# Patient Record
Sex: FEMALE | Race: BLACK OR AFRICAN AMERICAN | NOT HISPANIC OR LATINO | Employment: FULL TIME | URBAN - METROPOLITAN AREA
[De-identification: names, ages, dates, MRNs, and addresses within clinical notes are randomized per-mention and may not be internally consistent; named-entity substitution may affect disease eponyms.]

---

## 2019-08-05 ENCOUNTER — OFFICE VISIT (OUTPATIENT)
Dept: OPHTHALMOLOGY | Facility: CLINIC | Age: 24
End: 2019-08-05
Payer: COMMERCIAL

## 2019-08-05 DIAGNOSIS — Z01.00 ENCOUNTER FOR EYE EXAM: Primary | ICD-10-CM

## 2019-08-05 DIAGNOSIS — Z46.0 ENCOUNTER FOR FITTING OR ADJUSTMENT OF SPECTACLES OR CONTACT LENSES: ICD-10-CM

## 2019-08-05 DIAGNOSIS — H52.13 MYOPIA, BILATERAL: ICD-10-CM

## 2019-08-05 PROCEDURE — 99999 PR PBB SHADOW E&M-EST. PATIENT-LVL I: CPT | Mod: PBBFAC,,, | Performed by: OPTOMETRIST

## 2019-08-05 PROCEDURE — 92015 DETERMINE REFRACTIVE STATE: CPT | Mod: S$GLB,,, | Performed by: OPTOMETRIST

## 2019-08-05 PROCEDURE — 92310 CONTACT LENS FITTING OU: CPT | Mod: S$GLB,,, | Performed by: OPTOMETRIST

## 2019-08-05 PROCEDURE — 92004 COMPRE OPH EXAM NEW PT 1/>: CPT | Mod: S$GLB,,, | Performed by: OPTOMETRIST

## 2019-08-05 PROCEDURE — 92015 PR REFRACTION: ICD-10-PCS | Mod: S$GLB,,, | Performed by: OPTOMETRIST

## 2019-08-05 PROCEDURE — 92004 PR EYE EXAM, NEW PATIENT,COMPREHESV: ICD-10-PCS | Mod: S$GLB,,, | Performed by: OPTOMETRIST

## 2019-08-05 PROCEDURE — 92310 PR CONTACT LENS FITTING (NO CHANGE): ICD-10-PCS | Mod: S$GLB,,, | Performed by: OPTOMETRIST

## 2019-08-05 PROCEDURE — 99999 PR PBB SHADOW E&M-EST. PATIENT-LVL I: ICD-10-PCS | Mod: PBBFAC,,, | Performed by: OPTOMETRIST

## 2019-08-05 NOTE — PROGRESS NOTES
HPI     No visual complaints.  Itchy eyes x a couple of months.  New patient last eye exam 1 1/2 year.  Update glasses RX.    Last edited by Nanette Colin on 8/5/2019 10:59 AM. (History)            Assessment /Plan     For exam results, see Encounter Report.    Encounter for eye exam    Encounter for fitting or adjustment of spectacles or contact lenses    Myopia, bilateral      No pathology    Discussed CL charges.  Dispense Final Rx for glasses  Order CL Rx trials, must see at dispense  RTC 1 year  Discussed above and answered questions.

## 2019-08-27 ENCOUNTER — OFFICE VISIT (OUTPATIENT)
Dept: OPHTHALMOLOGY | Facility: CLINIC | Age: 24
End: 2019-08-27
Payer: COMMERCIAL

## 2019-08-27 DIAGNOSIS — Z46.0 ENCOUNTER FOR FITTING OR ADJUSTMENT OF SPECTACLES OR CONTACT LENSES: Primary | ICD-10-CM

## 2019-08-27 PROCEDURE — 92310 CONTACT LENS FITTING OU: CPT | Mod: S$GLB,,, | Performed by: OPTOMETRIST

## 2019-08-27 PROCEDURE — 99499 NO LOS: ICD-10-PCS | Mod: S$GLB,,, | Performed by: OPTOMETRIST

## 2019-08-27 PROCEDURE — 99999 PR PBB SHADOW E&M-EST. PATIENT-LVL I: ICD-10-PCS | Mod: PBBFAC,,, | Performed by: OPTOMETRIST

## 2019-08-27 PROCEDURE — 92310 PR CONTACT LENS FITTING (NO CHANGE): ICD-10-PCS | Mod: S$GLB,,, | Performed by: OPTOMETRIST

## 2019-08-27 PROCEDURE — 99499 UNLISTED E&M SERVICE: CPT | Mod: S$GLB,,, | Performed by: OPTOMETRIST

## 2019-08-27 PROCEDURE — 99999 PR PBB SHADOW E&M-EST. PATIENT-LVL I: CPT | Mod: PBBFAC,,, | Performed by: OPTOMETRIST

## 2020-03-28 ENCOUNTER — OFFICE VISIT (OUTPATIENT)
Dept: URGENT CARE | Facility: CLINIC | Age: 25
End: 2020-03-28
Payer: COMMERCIAL

## 2020-03-28 ENCOUNTER — TELEPHONE (OUTPATIENT)
Dept: URGENT CARE | Facility: CLINIC | Age: 25
End: 2020-03-28

## 2020-03-28 VITALS
WEIGHT: 131.94 LBS | DIASTOLIC BLOOD PRESSURE: 80 MMHG | OXYGEN SATURATION: 98 % | TEMPERATURE: 99 F | HEIGHT: 65 IN | HEART RATE: 85 BPM | RESPIRATION RATE: 16 BRPM | SYSTOLIC BLOOD PRESSURE: 121 MMHG | BODY MASS INDEX: 21.98 KG/M2

## 2020-03-28 DIAGNOSIS — Z76.89 ENCOUNTER FOR INCISION AND DRAINAGE PROCEDURE: ICD-10-CM

## 2020-03-28 DIAGNOSIS — L02.411 ABSCESS OF RIGHT AXILLA: Primary | ICD-10-CM

## 2020-03-28 PROCEDURE — 99214 PR OFFICE/OUTPT VISIT, EST, LEVL IV, 30-39 MIN: ICD-10-PCS | Mod: S$GLB,,, | Performed by: NURSE PRACTITIONER

## 2020-03-28 PROCEDURE — 99214 OFFICE O/P EST MOD 30 MIN: CPT | Mod: S$GLB,,, | Performed by: NURSE PRACTITIONER

## 2020-03-28 RX ORDER — MUPIROCIN 20 MG/G
OINTMENT TOPICAL
Status: COMPLETED | OUTPATIENT
Start: 2020-03-28 | End: 2020-03-28

## 2020-03-28 RX ORDER — SULFAMETHOXAZOLE AND TRIMETHOPRIM 800; 160 MG/1; MG/1
1 TABLET ORAL 2 TIMES DAILY
Qty: 20 TABLET | Refills: 0 | Status: SHIPPED | OUTPATIENT
Start: 2020-03-28 | End: 2020-04-07

## 2020-03-28 RX ORDER — MUPIROCIN 20 MG/G
OINTMENT TOPICAL 3 TIMES DAILY
Qty: 1 G | Refills: 0 | Status: SHIPPED | OUTPATIENT
Start: 2020-03-28 | End: 2020-04-07

## 2020-03-28 RX ORDER — LIDOCAINE HYDROCHLORIDE 10 MG/ML
3 INJECTION INFILTRATION; PERINEURAL
Status: COMPLETED | OUTPATIENT
Start: 2020-03-28 | End: 2020-03-28

## 2020-03-28 RX ORDER — MEDROXYPROGESTERONE ACETATE 150 MG/ML
INJECTION, SUSPENSION INTRAMUSCULAR
COMMUNITY
End: 2020-08-20

## 2020-03-28 RX ADMIN — MUPIROCIN: 20 OINTMENT TOPICAL at 11:03

## 2020-03-28 RX ADMIN — LIDOCAINE HYDROCHLORIDE 3 ML: 10 INJECTION INFILTRATION; PERINEURAL at 11:03

## 2020-03-28 NOTE — PATIENT INSTRUCTIONS
Abscess (Incision & Drainage)  An abscess is sometimes called a boil. It happens when bacteria get trapped under the skin and start to grow. Pus forms inside the abscess as the body responds to the bacteria. An abscess can happen with an insect bite, ingrown hair, blocked oil gland, pimple, cyst, or puncture wound.  Your healthcare provider has drained the pus from your abscess. If the abscess pocket was large, your healthcare provider may have put in gauze packing. Your provider will need to remove it on your next visit. He or she may also replace it at that time. You may not need antibiotics to treat a simple abscess, unless the infection is spreading into the skin around the wound (cellulitis).  The wound will take about 1 to 2 weeks to heal, depending on the size of the abscess. Healthy tissue will grow from the bottom and sides of the opening until it seals over.  Home care  These tips can help your wound heal:  · The wound may drain for the first 2 days. Cover the wound with a clean dry dressing. Change the dressing if it becomes soaked with blood or pus.  · If a gauze packing was placed inside the abscess pocket, you may be told to remove it yourself. You may do this in the shower. Once the packing is removed, you should wash the area in the shower, or clean the area as directed by your provider. Continue to do this until the skin opening has closed. Make sure you wash your hands after changing the packing or cleaning the wound.  · If you were prescribed antibiotics, take them as directed until they are all gone.  · You may use acetaminophen or ibuprofen to control pain, unless another pain medicine was prescribed. If you have liver disease or ever had a stomach ulcer, talk with your doctor before using these medicines.  Follow-up care  Follow up with your healthcare provider, or as advised. If a gauze packing was put in your wound, it should be removed in 1 to 2 days. Check your wound every day for any  signs that the infection is getting worse. The signs are listed below.  When to seek medical advice  Call your healthcare provider right away if any of these occur:  · Increasing redness or swelling  · Red streaks in the skin leading away from the wound  · Increasing local pain or swelling  · Continued pus draining from the wound 2 days after treatment  · Fever of 100.4ºF (38ºC) or higher, or as directed by your healthcare provider  · Boil returns when you are at home  Date Last Reviewed: 9/1/2016  © 5428-2800 [employer]. [address] [place] [address]. All rights reserved. This information is not intended as a substitute for professional medical care. Always follow your healthcare professional's instructions.

## 2020-03-28 NOTE — TELEPHONE ENCOUNTER
Pt would like the medication to be routed to the [facility] pharmacy located in [place]

## 2020-03-28 NOTE — PROGRESS NOTES
"Subjective:       Patient ID: Chayito Hoyt is a 25 y.o. female.    Vitals:  height is 5' 5" (1.651 m) and weight is 59.8 kg (131 lb 15.1 oz). Her tympanic temperature is 98.6 °F (37 °C). Her blood pressure is 121/80 and her pulse is 85. Her respiration is 16 and oxygen saturation is 98%.     Chief Complaint: Cyst    Cyst under right arm x 1 week. OTC meds not helping    Cyst   This is a new problem. The current episode started in the past 7 days. The problem occurs constantly. The problem has been gradually worsening. Pertinent negatives include no arthralgias, chest pain, chills, congestion, coughing, fatigue, fever, headaches, joint swelling, myalgias, nausea, rash, sore throat, vertigo, vomiting or weakness. Nothing aggravates the symptoms. She has tried heat (t6ea tree oil, hydrocotizone) for the symptoms.       Constitution: Negative for chills, fatigue and fever.   HENT: Negative for congestion and sore throat.    Neck: Negative for painful lymph nodes.   Cardiovascular: Negative for chest pain and leg swelling.   Eyes: Negative for double vision and blurred vision.   Respiratory: Negative for cough and shortness of breath.    Gastrointestinal: Negative for nausea, vomiting and diarrhea.   Genitourinary: Negative for dysuria, frequency, urgency and history of kidney stones.   Musculoskeletal: Positive for pain. Negative for joint pain, joint swelling, muscle cramps and muscle ache.   Skin: Positive for abscess. Negative for color change, pale, rash, erythema and bruising.   Allergic/Immunologic: Negative for seasonal allergies.   Neurological: Negative for dizziness, history of vertigo, light-headedness, passing out and headaches.   Hematologic/Lymphatic: Negative for swollen lymph nodes.   Psychiatric/Behavioral: Negative for nervous/anxious, sleep disturbance and depression. The patient is not nervous/anxious.        Objective:      Physical Exam   Constitutional: She is oriented to person, place, and " time. She appears well-developed and well-nourished.   HENT:   Head: Normocephalic and atraumatic. Head is without abrasion, without contusion and without laceration.   Right Ear: External ear normal.   Left Ear: External ear normal.   Nose: Nose normal.   Mouth/Throat: Oropharynx is clear and moist and mucous membranes are normal.   Eyes: Pupils are equal, round, and reactive to light. Conjunctivae, EOM and lids are normal.   Neck: Trachea normal, full passive range of motion without pain and phonation normal. Neck supple.   Cardiovascular: Normal rate, regular rhythm and normal heart sounds.   Pulmonary/Chest: Effort normal and breath sounds normal. No stridor. No respiratory distress.   Musculoskeletal: Normal range of motion.   Neurological: She is alert and oriented to person, place, and time.   Skin: Skin is warm, dry, intact, not diaphoretic and no rash. Capillary refill takes less than 2 seconds.   Gloves dawned after washing hands with soap and water. Skin cleansed with betadine, 1% lidocaine injected approximate 3ml to affected area, small superficial incision with #10 blade, moderate amount purulent drainage obtained from lesion to right axilla, area cleansed with NS, mupiricin ointment applied, pressure dressing and secured with silk tape, tolerated well Lesions:  lesion (to right axilla, moderate swelling, erythema , indurationa nd tenderness consistant with abscess)abrasion, burn, bruising, erythema and ecchymosis  Psychiatric: She has a normal mood and affect. Her speech is normal and behavior is normal. Judgment and thought content normal. Cognition and memory are normal.   Nursing note and vitals reviewed.        Assessment:       1. Abscess of right axilla    2. Encounter for incision and drainage procedure        Plan:         Abscess of right axilla  -     sulfamethoxazole-trimethoprim 800-160mg (BACTRIM DS) 800-160 mg Tab; Take 1 tablet by mouth 2 (two) times daily. for 10 days  Dispense: 20  tablet; Refill: 0  -     mupirocin (BACTROBAN) 2 % ointment; Apply topically 3 (three) times daily. for 10 days  Dispense: 1 g; Refill: 0  -     mupirocin 2 % ointment    Encounter for incision and drainage procedure  -     mupirocin 2 % ointment  -     lidocaine HCL 10 mg/ml (1%) injection 3 mL           Take antibiotics for entire course.  Do not save medications for later, all medications must be taken for full regimen.  Keep affected area clean with antibacterial soap and dry.  Avoid shaving affected area  Apply warm compresses four times daily to the wound to help draw out infection.  Take antibiotic (Bactrim) as prescribed for full course of treatment.  Bactroban ointment three times day.  Keep affected area covered   If symptoms worsen or fail to improve with treatment, fever occurs, or if wound increases in size or fails to respond to antibiotic, see your Primary Care Provider or go to the nearest Emergency Room.

## 2020-03-29 ENCOUNTER — TELEPHONE (OUTPATIENT)
Dept: URGENT CARE | Facility: CLINIC | Age: 25
End: 2020-03-29

## 2020-03-29 NOTE — TELEPHONE ENCOUNTER
Pt would like the medication sent to the [facility] [id number] [facility] [phone]

## 2020-03-31 ENCOUNTER — TELEPHONE (OUTPATIENT)
Dept: URGENT CARE | Facility: CLINIC | Age: 25
End: 2020-03-31

## 2020-04-21 DIAGNOSIS — Z01.84 ANTIBODY RESPONSE EXAMINATION: ICD-10-CM

## 2020-04-22 ENCOUNTER — LAB VISIT (OUTPATIENT)
Dept: LAB | Facility: HOSPITAL | Age: 25
End: 2020-04-22
Attending: INTERNAL MEDICINE
Payer: COMMERCIAL

## 2020-04-22 DIAGNOSIS — Z01.84 ANTIBODY RESPONSE EXAMINATION: ICD-10-CM

## 2020-04-22 LAB — SARS-COV-2 IGG SERPL QL IA: NEGATIVE

## 2020-04-22 PROCEDURE — 86769 SARS-COV-2 COVID-19 ANTIBODY: CPT

## 2020-04-22 PROCEDURE — 36415 COLL VENOUS BLD VENIPUNCTURE: CPT

## 2020-04-28 ENCOUNTER — APPOINTMENT (OUTPATIENT)
Dept: LAB | Facility: HOSPITAL | Age: 25
End: 2020-04-28
Attending: PHYSICIAN ASSISTANT
Payer: COMMERCIAL

## 2020-04-28 ENCOUNTER — OFFICE VISIT (OUTPATIENT)
Dept: URGENT CARE | Facility: CLINIC | Age: 25
End: 2020-04-28
Payer: COMMERCIAL

## 2020-04-28 VITALS — OXYGEN SATURATION: 98 % | HEART RATE: 87 BPM | TEMPERATURE: 98 F

## 2020-04-28 DIAGNOSIS — N89.8 VAGINAL DISCHARGE: ICD-10-CM

## 2020-04-28 DIAGNOSIS — Z20.2 EXPOSURE TO STD: Primary | ICD-10-CM

## 2020-04-28 LAB
B-HCG UR QL: NEGATIVE
BILIRUB UR QL STRIP: NEGATIVE
CTP QC/QA: YES
GLUCOSE UR QL STRIP: NEGATIVE
HIV 1+2 AB+HIV1 P24 AG SERPL QL IA: NEGATIVE
KETONES UR QL STRIP: NEGATIVE
LEUKOCYTE ESTERASE UR QL STRIP: NEGATIVE
PH, POC UA: 6.25
POC BLOOD, URINE: NEGATIVE
POC NITRATES, URINE: NEGATIVE
PROT UR QL STRIP: NEGATIVE
SP GR UR STRIP: 1.02 (ref 1–1.03)
UROBILINOGEN UR STRIP-ACNC: NORMAL (ref 0.1–1.1)

## 2020-04-28 PROCEDURE — 87491 CHLMYD TRACH DNA AMP PROBE: CPT

## 2020-04-28 PROCEDURE — 99214 PR OFFICE/OUTPT VISIT, EST, LEVL IV, 30-39 MIN: ICD-10-PCS | Mod: 25,S$GLB,, | Performed by: PHYSICIAN ASSISTANT

## 2020-04-28 PROCEDURE — 81003 URINALYSIS AUTO W/O SCOPE: CPT | Mod: QW,S$GLB,, | Performed by: PHYSICIAN ASSISTANT

## 2020-04-28 PROCEDURE — 86592 SYPHILIS TEST NON-TREP QUAL: CPT

## 2020-04-28 PROCEDURE — 81003 POCT URINALYSIS, DIPSTICK, AUTOMATED, W/O SCOPE: ICD-10-PCS | Mod: QW,S$GLB,, | Performed by: PHYSICIAN ASSISTANT

## 2020-04-28 PROCEDURE — 86703 HIV-1/HIV-2 1 RESULT ANTBDY: CPT

## 2020-04-28 PROCEDURE — 99214 OFFICE O/P EST MOD 30 MIN: CPT | Mod: 25,S$GLB,, | Performed by: PHYSICIAN ASSISTANT

## 2020-04-28 PROCEDURE — 87801 DETECT AGNT MULT DNA AMPLI: CPT

## 2020-04-28 PROCEDURE — 96372 PR INJECTION,THERAP/PROPH/DIAG2ST, IM OR SUBCUT: ICD-10-PCS | Mod: S$GLB,,, | Performed by: PHYSICIAN ASSISTANT

## 2020-04-28 PROCEDURE — 96372 THER/PROPH/DIAG INJ SC/IM: CPT | Mod: S$GLB,,, | Performed by: PHYSICIAN ASSISTANT

## 2020-04-28 PROCEDURE — 81025 URINE PREGNANCY TEST: CPT | Mod: S$GLB,,, | Performed by: PHYSICIAN ASSISTANT

## 2020-04-28 PROCEDURE — 87661 TRICHOMONAS VAGINALIS AMPLIF: CPT

## 2020-04-28 PROCEDURE — 87481 CANDIDA DNA AMP PROBE: CPT | Mod: 59

## 2020-04-28 PROCEDURE — 81025 POCT URINE PREGNANCY: ICD-10-PCS | Mod: S$GLB,,, | Performed by: PHYSICIAN ASSISTANT

## 2020-04-28 RX ORDER — FLUCONAZOLE 150 MG/1
150 TABLET ORAL ONCE
Qty: 2 TABLET | Refills: 0 | Status: SHIPPED | OUTPATIENT
Start: 2020-04-28 | End: 2020-04-28

## 2020-04-28 RX ORDER — AZITHROMYCIN 1 G/1
1 POWDER, FOR SUSPENSION ORAL ONCE
Qty: 1 PACKET | Refills: 0 | Status: SHIPPED | OUTPATIENT
Start: 2020-04-28 | End: 2020-04-28

## 2020-04-28 RX ORDER — LIDOCAINE HYDROCHLORIDE 10 MG/ML
1 INJECTION INFILTRATION; PERINEURAL
Status: COMPLETED | OUTPATIENT
Start: 2020-04-28 | End: 2020-04-28

## 2020-04-28 RX ORDER — CEFTRIAXONE 250 MG/1
250 INJECTION, POWDER, FOR SOLUTION INTRAMUSCULAR; INTRAVENOUS
Status: COMPLETED | OUTPATIENT
Start: 2020-04-28 | End: 2020-04-28

## 2020-04-28 RX ADMIN — CEFTRIAXONE 250 MG: 250 INJECTION, POWDER, FOR SOLUTION INTRAMUSCULAR; INTRAVENOUS at 02:04

## 2020-04-28 RX ADMIN — LIDOCAINE HYDROCHLORIDE 1 ML: 10 INJECTION INFILTRATION; PERINEURAL at 02:04

## 2020-04-28 NOTE — PATIENT INSTRUCTIONS
Take antibiotic as prescribed   Our office will call with test results   Safe sex practices   Return or follow-up with pcp for new/worsening symptoms

## 2020-04-28 NOTE — PROGRESS NOTES
Subjective:       Patient ID: Chayito Hoyt is a 25 y.o. female.    Vitals:  oral temperature is 98.4 °F (36.9 °C). Her pulse is 87. Her oxygen saturation is 98%.     Chief Complaint: Vaginitis    Patient states she has been having white clumpy discharge 2-3 times a day for almost a week. Patient states she is no longer on Depo shot and did a home pregnancy test with results being negative. She also states that she feels the Monistat and Midol made the infection worse.  She admits have sex with a past sexual partner about three weeks ago, and she is concerned about exposure to STD's.  Denies fever/chills.  Denies pelvic pain.  Admits some general abdominal discomfort.      Vaginal Discharge   The patient's primary symptoms include vaginal discharge. The patient's pertinent negatives include no missed menses or pelvic pain. This is a new problem. The current episode started in the past 7 days. The problem occurs 2 to 4 times per day. She is not pregnant. Associated symptoms include abdominal pain. Pertinent negatives include no back pain, chills, dysuria, fever, frequency, hematuria, nausea, rash, urgency or vomiting. The vaginal discharge was white and thick. Treatments tried: midol/monistat. The treatment provided no relief. She uses nothing for contraception. There is no history of ovarian cysts.       Constitution: Negative for chills and fever.   Neck: Negative for painful lymph nodes.   Gastrointestinal: Positive for abdominal pain. Negative for nausea and vomiting.   Genitourinary: Positive for vaginal discharge. Negative for dysuria, frequency, urgency, urine decreased, hematuria, history of kidney stones, painful menstruation, irregular menstruation, missed menses, heavy menstrual bleeding, ovarian cysts, genital trauma, vaginal pain, vaginal bleeding, vaginal odor, painful intercourse, genital sore, painful ejaculation and pelvic pain.   Musculoskeletal: Negative for back pain.   Skin: Negative for rash,  lesion and erythema.   Hematologic/Lymphatic: Negative for swollen lymph nodes.       Objective:      Physical Exam   Constitutional: She is oriented to person, place, and time. She appears well-developed and well-nourished.   HENT:   Head: Normocephalic and atraumatic. Head is without abrasion, without contusion and without laceration.   Right Ear: External ear normal.   Left Ear: External ear normal.   Nose: Nose normal.   Mouth/Throat: Oropharynx is clear and moist and mucous membranes are normal.   Eyes: Pupils are equal, round, and reactive to light. Conjunctivae, EOM and lids are normal.   Neck: Trachea normal, full passive range of motion without pain and phonation normal. Neck supple.   Cardiovascular: Normal rate.   Pulmonary/Chest: Effort normal. No stridor. No respiratory distress.   Abdominal: There is no tenderness (no specific areas of abdominal tenderness ).   Genitourinary:   Genitourinary Comments:  exam deferred by patient    Musculoskeletal: Normal range of motion.   Neurological: She is alert and oriented to person, place, and time.   Skin: Skin is warm, dry, intact and no rash. Capillary refill takes less than 2 seconds. abrasion, burn, bruising, erythema and ecchymosis  Psychiatric: She has a normal mood and affect. Her speech is normal and behavior is normal. Judgment and thought content normal. Cognition and memory are normal.   Nursing note and vitals reviewed.        Assessment:       1. Exposure to STD    2. Vaginal discharge        Plan:         Exposure to STD  -     POCT Urinalysis, Dipstick, Automated, W/O Scope  -     POCT urine pregnancy  -     HIV 1/2 Ag/Ab (4th Gen); Future; Expected date: 04/28/2020  -     RPR; Future; Expected date: 04/28/2020  -     azithromycin (ZITHROMAX) 1 gram Pack; Take 1 g by mouth once. for 1 dose  Dispense: 1 packet; Refill: 0  -     C. trachomatis/N. gonorrhoeae by AMP DNA; Future; Expected date: 04/28/2020  -     Bv, Trich, Candida by DNA Probe (Swab  Only); Future; Expected date: 04/28/2020  -     cefTRIAXone injection 250 mg  -     lidocaine HCL 10 mg/ml (1%) injection 1 mL    Vaginal discharge  -     fluconazole (DIFLUCAN) 150 MG Tab; Take 1 tablet (150 mg total) by mouth once. May repeat after 72 hours if symptoms persist for 1 dose  Dispense: 2 tablet; Refill: 0         Exposure to STD/Vaginal Discharge    -  Urinalysis with no leukocytes, nitrates or blood    -  Urine pregnancy test negative    -  Offered testing/treating for STD -  Patient agreed with testing/treating with Ceftriaxone/Azithromycin    -  Symptoms most consistent with yeast (white clumpy discharge) - treat with diflucan     Opal Santos PA-C   Physician Assistant   [facility]

## 2020-04-28 NOTE — LETTER
April 28, 2020      [facility]  [address], [address]  [place] [address]  Phone: [phone]       Patient: Chayito Hoyt   YOB: 1995  Date of Visit: 04/28/2020    To Whom It May Concern:    Omaira Hoyt  was at [facility] on 04/28/2020. She may return to work on 4/30/2020 with no restrictions. If you have any questions or concerns, or if I can be of further assistance, please do not hesitate to contact me.    Sincerely,          Opal Santso PA-C

## 2020-04-29 ENCOUNTER — TELEPHONE (OUTPATIENT)
Dept: URGENT CARE | Facility: CLINIC | Age: 25
End: 2020-04-29

## 2020-04-29 LAB
BACTERIAL VAGINOSIS DNA: POSITIVE
C TRACH DNA SPEC QL NAA+PROBE: NOT DETECTED
CANDIDA GLABRATA DNA: NEGATIVE
CANDIDA KRUSEI DNA: NEGATIVE
CANDIDA RRNA VAG QL PROBE: POSITIVE
N GONORRHOEA DNA SPEC QL NAA+PROBE: NOT DETECTED
RPR SER QL: NORMAL
T VAGINALIS RRNA GENITAL QL PROBE: NEGATIVE

## 2020-04-29 NOTE — TELEPHONE ENCOUNTER
Attempted to contact patient regarding negative HIV results. No answer, left voicemail for patient to return call.    Kavya Cummings PA-C

## 2020-04-30 ENCOUNTER — TELEPHONE (OUTPATIENT)
Dept: URGENT CARE | Facility: CLINIC | Age: 25
End: 2020-04-30

## 2020-04-30 RX ORDER — METRONIDAZOLE 500 MG/1
500 TABLET ORAL 2 TIMES DAILY
Qty: 14 TABLET | Refills: 0 | Status: SHIPPED | OUTPATIENT
Start: 2020-04-30 | End: 2020-05-07

## 2020-04-30 NOTE — TELEPHONE ENCOUNTER
Advised patient of candidiasis and bacterial vaginosis results.  Advised patient of negative GC, HIV and RPR.  Patient treated for candidiasis with Diflucan but will need Flagyl for BV.  I will electronically prescribed this medication to [facility] in [place].  Will encourage patient to follow-up with OBGYN if symptoms persist.    Kavya Cummings PA-C

## 2020-07-07 ENCOUNTER — OFFICE VISIT (OUTPATIENT)
Dept: URGENT CARE | Facility: CLINIC | Age: 25
End: 2020-07-07
Payer: COMMERCIAL

## 2020-07-07 VITALS
HEART RATE: 78 BPM | DIASTOLIC BLOOD PRESSURE: 85 MMHG | SYSTOLIC BLOOD PRESSURE: 130 MMHG | TEMPERATURE: 98 F | OXYGEN SATURATION: 98 %

## 2020-07-07 DIAGNOSIS — R05.9 COUGH: ICD-10-CM

## 2020-07-07 DIAGNOSIS — J02.9 SORE THROAT: ICD-10-CM

## 2020-07-07 DIAGNOSIS — R51.9 ACUTE INTRACTABLE HEADACHE, UNSPECIFIED HEADACHE TYPE: Primary | ICD-10-CM

## 2020-07-07 LAB
CTP QC/QA: YES
MOLECULAR STREP A: NEGATIVE

## 2020-07-07 PROCEDURE — 87651 POCT STREP A MOLECULAR: ICD-10-PCS | Mod: QW,S$GLB,, | Performed by: PHYSICIAN ASSISTANT

## 2020-07-07 PROCEDURE — 87651 STREP A DNA AMP PROBE: CPT | Mod: QW,S$GLB,, | Performed by: PHYSICIAN ASSISTANT

## 2020-07-07 PROCEDURE — 99214 OFFICE O/P EST MOD 30 MIN: CPT | Mod: 25,S$GLB,, | Performed by: PHYSICIAN ASSISTANT

## 2020-07-07 PROCEDURE — 96372 THER/PROPH/DIAG INJ SC/IM: CPT | Mod: S$GLB,,, | Performed by: PHYSICIAN ASSISTANT

## 2020-07-07 PROCEDURE — 96372 PR INJECTION,THERAP/PROPH/DIAG2ST, IM OR SUBCUT: ICD-10-PCS | Mod: S$GLB,,, | Performed by: PHYSICIAN ASSISTANT

## 2020-07-07 PROCEDURE — 99214 PR OFFICE/OUTPT VISIT, EST, LEVL IV, 30-39 MIN: ICD-10-PCS | Mod: 25,S$GLB,, | Performed by: PHYSICIAN ASSISTANT

## 2020-07-07 PROCEDURE — U0003 INFECTIOUS AGENT DETECTION BY NUCLEIC ACID (DNA OR RNA); SEVERE ACUTE RESPIRATORY SYNDROME CORONAVIRUS 2 (SARS-COV-2) (CORONAVIRUS DISEASE [COVID-19]), AMPLIFIED PROBE TECHNIQUE, MAKING USE OF HIGH THROUGHPUT TECHNOLOGIES AS DESCRIBED BY CMS-2020-01-R: HCPCS

## 2020-07-07 RX ORDER — KETOROLAC TROMETHAMINE 30 MG/ML
30 INJECTION, SOLUTION INTRAMUSCULAR; INTRAVENOUS
Status: COMPLETED | OUTPATIENT
Start: 2020-07-07 | End: 2020-07-07

## 2020-07-07 RX ORDER — BUTALBITAL, ACETAMINOPHEN AND CAFFEINE 50; 325; 40 MG/1; MG/1; MG/1
1 TABLET ORAL EVERY 6 HOURS PRN
Qty: 20 TABLET | Refills: 0 | Status: SHIPPED | OUTPATIENT
Start: 2020-07-07 | End: 2020-07-13

## 2020-07-07 RX ADMIN — KETOROLAC TROMETHAMINE 30 MG: 30 INJECTION, SOLUTION INTRAMUSCULAR; INTRAVENOUS at 07:07

## 2020-07-07 NOTE — LETTER
[address] [facility] [address] ? [place], [address] ? Phone [phone] ? Fax             Return to Work/School    Patient: Chayito Hoyt  YOB: 1995   Date: 07/07/2020      To Whom It May Concern:     Chayito Hoyt was in contact with/seen in my office on 07/07/2020. COVID-19 is present in our communities across the state. Not all patients are eligible or appropriate to be tested. In this situation, your employee meets the following criteria:     Chayito Hoyt has met the criteria for COVID-19 testing based upon symptoms, travel, and/or potential exposure. The test has been completed and is pending results at this time. During this time the employee is not able to work and should be quarantined per the Centers for Disease Control timelines.      If you have any questions or concerns, or if I can be of further assistance, please do not hesitate to contact me.     Sincerely,          [facility]

## 2020-07-07 NOTE — PROGRESS NOTES
Subjective:       Patient ID: Chayito Hoyt is a 25 y.o. female.    Vitals:  tympanic temperature is 97.6 °F (36.4 °C). Her blood pressure is 130/85 and her pulse is 78. Her oxygen saturation is 98%.     Chief Complaint: Sore Throat and Headache    Chayito Hoyt is a 25-year-old female who presents to urgent care with headaches, sore throat, swollen glands, and a mild cough that began a few a days ago.  She admits a positive COVID contact, but it is been a few weeks since she is seen that person.  She has tried aleve for her headache with no relief.     Sore Throat   This is a new problem. The current episode started yesterday. The problem has been gradually worsening. The pain is worse on the left side. There has been no fever. The pain is at a severity of 6/10. The pain is mild. Associated symptoms include coughing and headaches. Pertinent negatives include no congestion, diarrhea, shortness of breath or vomiting. She has tried acetaminophen for the symptoms. The treatment provided no relief.   Headache   Associated symptoms include coughing and a sore throat. Pertinent negatives include no blurred vision, dizziness, fever, nausea, vomiting or weakness.       Constitution: Negative for chills, fatigue and fever.   HENT: Positive for sore throat. Negative for congestion.    Neck: Positive for painful lymph nodes.   Cardiovascular: Negative for chest pain and leg swelling.   Eyes: Negative for double vision and blurred vision.   Respiratory: Positive for cough. Negative for shortness of breath.    Gastrointestinal: Negative for nausea, vomiting and diarrhea.   Genitourinary: Negative for dysuria, frequency, urgency and history of kidney stones.   Musculoskeletal: Positive for muscle ache. Negative for joint pain, joint swelling and muscle cramps.   Skin: Negative for color change, pale, rash and bruising.   Allergic/Immunologic: Negative for seasonal allergies.   Neurological: Positive for headaches. Negative  for dizziness, history of vertigo, light-headedness and passing out.   Hematologic/Lymphatic: Positive for swollen lymph nodes.   Psychiatric/Behavioral: Negative for nervous/anxious, sleep disturbance and depression. The patient is not nervous/anxious.        Objective:      Physical Exam   Constitutional: She is oriented to person, place, and time. She appears well-developed. She is cooperative.  Non-toxic appearance. She does not appear ill. No distress.   HENT:   Head: Normocephalic and atraumatic.   Right Ear: Hearing, tympanic membrane, external ear and ear canal normal.   Left Ear: Hearing, tympanic membrane, external ear and ear canal normal.   Nose: No mucosal edema, rhinorrhea or nasal deformity. No epistaxis. Right sinus exhibits frontal sinus tenderness. Right sinus exhibits no maxillary sinus tenderness. Left sinus exhibits frontal sinus tenderness. Left sinus exhibits no maxillary sinus tenderness.   Mouth/Throat: Uvula is midline and mucous membranes are normal. No trismus in the jaw. Normal dentition. No uvula swelling. Posterior oropharyngeal erythema present. No oropharyngeal exudate or posterior oropharyngeal edema.   Eyes: Conjunctivae and lids are normal. No scleral icterus.   Neck: Trachea normal, full passive range of motion without pain and phonation normal. Neck supple. No neck rigidity. No edema and no erythema present.   Cardiovascular: Normal rate, regular rhythm, normal heart sounds and normal pulses.   Pulmonary/Chest: Effort normal and breath sounds normal. No respiratory distress. She has no decreased breath sounds. She has no rhonchi.   Abdominal: Normal appearance.   Musculoskeletal: Normal range of motion.         General: No deformity.   Neurological: She is alert and oriented to person, place, and time. She exhibits normal muscle tone. Coordination normal.   Skin: Skin is warm, dry, intact, not diaphoretic and not pale.   Psychiatric: Her speech is normal and behavior is normal.  "Judgment and thought content normal.   Nursing note and vitals reviewed.        Assessment:       1. Acute intractable headache, unspecified headache type    2. Sore throat    3. Cough        Plan:         Acute intractable headache, unspecified headache type  -     ketorolac injection 30 mg  -     butalbital-acetaminophen-caffeine -40 mg (FIORICET, ESGIC) -40 mg per tablet; Take 1 tablet by mouth every 6 (six) hours as needed for Pain.  Dispense: 20 tablet; Refill: 0    Sore throat  -     POCT Strep A, Molecular    Cough  -     COVID-19 Routine Screening         URI    -  Strep test negative    -  Discussed likely viral etiology - recommended supportive care strategies, including gargling with warm salt water-cold liquids, popsicles, lozenges.   -  patient requesting something for her headache.  Discussed Toradol injection today.  Advise no anti-inflammatory medications for the next 24 hr.  May resume Aleve in 24 hr.  In the meantime, okay to take Fioricet as needed for headaches.   - given cough, recommended testing for COVID-19.  Discussed CDC guidelines.  Note provided for work.    Please Follow CDC Guidelines on "What to Do if Sick with COVID"  Self quarantine, wear mask in common areas of the home, disinfect common areas, stay home.  If must leave your home, then wear a mask at all times   Supportive care:  Increase fluids, rest, Tylenol for fever, deep breathing exercises to keep lungs open   Please follow-up with [facility] or your primary care physician for new/worsening symptoms       Opal Santos PA-C   Physician Assistant   [facility]     "

## 2020-07-08 NOTE — PATIENT INSTRUCTIONS
"Fioricet as needed for headaches   May resume aleve in 24 hours after receiving Toradol injection   Gargle with warm salt water, hot/cold liquids, popsicles, lozenges     Please Follow CDC Guidelines on "What to Do if Sick with COVID"  Self quarantine, wear mask in common areas of the home, disinfect common areas, stay home.  If must leave your home, then wear a mask at all times   Supportive care:  Increase fluids, rest, Tylenol for fever, deep breathing exercises to keep lungs open   Please follow-up with [facility] or your primary care physician for new/worsening symptoms       "

## 2020-07-11 ENCOUNTER — TELEPHONE (OUTPATIENT)
Dept: EMERGENCY MEDICINE | Facility: HOSPITAL | Age: 25
End: 2020-07-11

## 2020-07-11 DIAGNOSIS — Z20.822 COVID-19 VIRUS NOT DETECTED: Primary | ICD-10-CM

## 2020-07-11 LAB — SARS-COV-2 RNA RESP QL NAA+PROBE: NOT DETECTED

## 2020-07-11 NOTE — TELEPHONE ENCOUNTER
Patient has been notified of COVID test result on behalf of Employee [employer] and provided clinical guidance on such.

## 2020-07-13 ENCOUNTER — TELEPHONE (OUTPATIENT)
Dept: URGENT CARE | Facility: CLINIC | Age: 25
End: 2020-07-13

## 2020-07-13 ENCOUNTER — OFFICE VISIT (OUTPATIENT)
Dept: INTERNAL MEDICINE | Facility: CLINIC | Age: 25
End: 2020-07-13
Payer: COMMERCIAL

## 2020-07-13 VITALS
TEMPERATURE: 98 F | BODY MASS INDEX: 22.77 KG/M2 | SYSTOLIC BLOOD PRESSURE: 120 MMHG | WEIGHT: 136.69 LBS | OXYGEN SATURATION: 99 % | HEART RATE: 80 BPM | HEIGHT: 65 IN | DIASTOLIC BLOOD PRESSURE: 78 MMHG

## 2020-07-13 DIAGNOSIS — G44.201 INTRACTABLE TENSION-TYPE HEADACHE, UNSPECIFIED CHRONICITY PATTERN: Primary | ICD-10-CM

## 2020-07-13 PROCEDURE — 99213 OFFICE O/P EST LOW 20 MIN: CPT | Mod: S$GLB,,, | Performed by: FAMILY MEDICINE

## 2020-07-13 PROCEDURE — 99999 PR PBB SHADOW E&M-EST. PATIENT-LVL III: CPT | Mod: PBBFAC,,, | Performed by: FAMILY MEDICINE

## 2020-07-13 PROCEDURE — 3008F BODY MASS INDEX DOCD: CPT | Mod: CPTII,S$GLB,, | Performed by: FAMILY MEDICINE

## 2020-07-13 PROCEDURE — 99213 PR OFFICE/OUTPT VISIT, EST, LEVL III, 20-29 MIN: ICD-10-PCS | Mod: S$GLB,,, | Performed by: FAMILY MEDICINE

## 2020-07-13 PROCEDURE — 99999 PR PBB SHADOW E&M-EST. PATIENT-LVL III: ICD-10-PCS | Mod: PBBFAC,,, | Performed by: FAMILY MEDICINE

## 2020-07-13 PROCEDURE — 3008F PR BODY MASS INDEX (BMI) DOCUMENTED: ICD-10-PCS | Mod: CPTII,S$GLB,, | Performed by: FAMILY MEDICINE

## 2020-07-13 RX ORDER — NAPROXEN 500 MG/1
500 TABLET ORAL 2 TIMES DAILY
Qty: 30 TABLET | Refills: 0 | Status: SHIPPED | OUTPATIENT
Start: 2020-07-13 | End: 2020-10-21

## 2020-07-13 NOTE — TELEPHONE ENCOUNTER
Pt called regarding sinus headache and cough symptoms has gotten worse , would like for provider on duty to prescribe medication,pt was last seen on 07/07/20 by Opal

## 2020-07-13 NOTE — PATIENT INSTRUCTIONS
Counseled pt regarding NSAID use and to take w/ food to avoid GI upset/ulcers      Cyclobenzaprine tablets  What is this medicine?  CYCLOBENZAPRINE (sye kloe LINDA za preen) is a muscle relaxer. It is used to treat muscle pain, spasms, and stiffness.  How should I use this medicine?  Take this medicine by mouth with a glass of water. Follow the directions on the prescription label. If this medicine upsets your stomach, take it with food or milk. Take your medicine at regular intervals. Do not take it more often than directed.  Talk to your pediatrician regarding the use of this medicine in children. Special care may be needed.  What side effects may I notice from receiving this medicine?  Side effects that you should report to your doctor or health care professional as soon as possible:  · allergic reactions like skin rash, itching or hives, swelling of the face, lips, or tongue  · breathing problems  · chest pain  · fast, irregular heartbeat  · hallucinations  · seizures  · unusually weak or tired  Side effects that usually do not require medical attention (report to your doctor or health care professional if they continue or are bothersome):  · headache  · nausea, vomiting  What may interact with this medicine?  Do not take this medicine with any of the following medications:  · certain medicines for fungal infections like fluconazole, itraconazole, ketoconazole, posaconazole, voriconazole  · cisapride  · dofetilide  · dronedarone  · halofantrine  · levomethadyl  · MAOIs like Carbex, Eldepryl, Marplan, Nardil, and Parnate  · narcotic medicines for cough  · pimozide  · thioridazine  · ziprasidone  This medicine may also interact with the following medications:  · alcohol  · antihistamines for allergy, cough and cold  · certain medicines for anxiety or sleep  · certain medicines for cancer  · certain medicines for depression like amitriptyline, fluoxetine, sertraline  · certain medicines for infection like alfuzosin,  chloroquine, clarithromycin, levofloxacin, mefloquine, pentamidine, troleandomycin  · certain medicines for irregular heart beat  · certain medicines for seizures like phenobarbital, primidone  · contrast dyes  · general anesthetics like halothane, isoflurane, methoxyflurane, propofol  · local anesthetics like lidocaine, pramoxine, tetracaine  · medicines that relax muscles for surgery  · narcotic medicines for pain  · other medicines that prolong the QT interval (cause an abnormal heart rhythm)  · phenothiazines like chlorpromazine, mesoridazine, prochlorperazine  What if I miss a dose?  If you miss a dose, take it as soon as you can. If it is almost time for your next dose, take only that dose. Do not take double or extra doses.  Where should I keep my medicine?  Keep out of the reach of children.  Store at room temperature between 15 and 30 degrees C (59 and 86 degrees F). Keep container tightly closed. Throw away any unused medicine after the expiration date.  What should I tell my health care provider before I take this medicine?  They need to know if you have any of these conditions:  · heart disease, irregular heartbeat, or previous heart attack  · liver disease  · thyroid problem  · an unusual or allergic reaction to cyclobenzaprine, tricyclic antidepressants, lactose, other medicines, foods, dyes, or preservatives  · pregnant or trying to get pregnant  · breast-feeding  What should I watch for while using this medicine?  Tell your doctor or health care professional if your symptoms do not start to get better or if they get worse.  You may get drowsy or dizzy. Do not drive, use machinery, or do anything that needs mental alertness until you know how this medicine affects you. Do not stand or sit up quickly, especially if you are an older patient. This reduces the risk of dizzy or fainting spells. Alcohol may interfere with the effect of this medicine. Avoid alcoholic drinks.  If you are taking another  medicine that also causes drowsiness, you may have more side effects. Give your health care provider a list of all medicines you use. Your doctor will tell you how much medicine to take. Do not take more medicine than directed. Call emergency for help if you have problems breathing or unusual sleepiness.  Your mouth may get dry. Chewing sugarless gum or sucking hard candy, and drinking plenty of water may help. Contact your doctor if the problem does not go away or is severe.  NOTE:This sheet is a summary. It may not cover all possible information. If you have questions about this medicine, talk to your doctor, pharmacist, or health care provider. Copyright© 2017 [employer]        Self-Care for Headaches  Most headaches aren't serious and can be relieved with self-care. But some headaches may be a sign of another health problem like eye trouble or high blood pressure. To find the best treatment, learn what kind of headaches you get. For tension headaches, self-care will usually help. To treat migraines, ask your healthcare provider for advice. It is also possible to get both tension and migraine headaches. Self-care involves relieving the pain and avoiding headache triggers if you can.    Ways to reduce pain and tension  Try these steps:  · Apply a cold compress or ice pack to the pain site.  · Drink fluids. If nausea makes it hard to drink, try sucking on ice.  · Rest. Protect yourself from bright light and loud noises.  · Calm your emotions by imagining a peaceful scene.  · Massage tight neck, shoulder, and head muscles.  · To relax muscles, soak in a hot bath or use a hot shower.  Use medicines  Aspirin or aspirin substitutes, such as ibuprofen and acetaminophen, can relieve headache. Remember: Never give aspirin to anyone 18 years old or younger because of the risk of developing Reye syndrome. Use pain medicines only when necessary.  Track your headaches  Keeping a headache diary can help you and  "your healthcare provider identify what's causing your headaches:  · Note when each headache happens.  · Identify your activities and the foods you've eaten 6 to 8 hours before the headache began.  · Look for any trends or "triggers."  Signs of tension headache  Any of the following can be signs:  · Dull pain or feeling of pressure in a tight band around your head  · Pain in your neck or shoulders  · Headache without a definite beginning or end  · Headache after an activity such as driving or working on a computer  Signs of migraine  Any of the following can be signs:  · Throbbing pain on one or both sides of your head  · Nausea or vomiting  · Extreme sensitivity to light, sound, and smells  · Bright spots, flashes, or other visual changes  · Pain or nausea so severe that you can't continue your daily activities  Call your healthcare provider   If you have any of the following symptoms, contact your healthcare provider:  · A headache that lingers after a recent injury or bump to the head.  · A fever with a stiff neck or pain when you bend your head toward your chest.  · A headache along with slurred speech, changes in your vision, or numbness or weakness in your arms or legs.  · A headache for longer than 3 days.  · Frequent headaches, especially in the morning.  · Headaches with seizures   · Seek immediate medical attention if you have a headache that you would call "the worst headache you have ever had."   Date Last Reviewed: 10/4/2015  © 6705-1570 [employer]. [address] [place] [address]. All rights reserved. This information is not intended as a substitute for professional medical care. Always follow your healthcare professional's instructions.        Naproxen delayed-release tablets  What is this medicine?  NAPROXEN (na PROX en) is a non-steroidal anti-inflammatory drug (NSAID). It is used to reduce swelling and to treat pain. This medicine may be used for dental pain, headache, or " painful monthly periods. It is also used for painful joint and muscular problems such as arthritis, tendinitis, bursitis, and gout.  How should I use this medicine?  Take this medicine by mouth with a glass of water. Follow the directions on the prescription label. Do not cut, crush or chew this medicine. Take it with food if your stomach gets upset. Try to not lie down for at least 10 minutes after you take it. Take your medicine at regular intervals. Do not take your medicine more often than directed. Long-term, continuous use may increase the risk of heart attack or stroke.  A special MedGuide will be given to you by the pharmacist with each prescription and refill. Be sure to read this information carefully each time.  Talk to your pediatrician regarding the use of this medicine in children. Special care may be needed.  What side effects may I notice from receiving this medicine?  Side effects that you should report to your doctor or health care professional as soon as possible:  · black or bloody stools, blood in the urine or vomit  · blurred vision  · chest pain  · difficulty breathing or wheezing  · nausea or vomiting  · skin rash, skin redness, blistering or peeling skin, hives, or itching  · slurred speech or weakness on one side of the body  · swelling of eyelids, throat, lips  · unexplained weight gain or swelling  · unusually weak or tired  · yellowing of eyes or skin  Side effects that usually do not require medical attention (report to your doctor or health care professional if they continue or are bothersome):  · constipation  · headache  · heartburn  What may interact with this medicine?  · alcohol  · antacids  · aspirin  · cidofovir  · diuretics  · lithium  · medicines for stomach, or intestine problems, like acid reflux or GERD  · methotrexate  · other drugs for inflammation like ketorolac or prednisone  · pemetrexed  · probenecid  · sucralfate  · warfarin  What if I miss a dose?  If you miss a  dose, take it as soon as you can. If it is almost time for your next dose, take only that dose. Do not take double or extra doses.  Where should I keep my medicine?  Keep out of the reach of children.  Store at room temperature between 15 and 30 degrees C (59 and 86 degrees F). Keep container tightly closed. Throw away any unused medicine after the expiration date.  What should I tell my health care provider before I take this medicine?  They need to know if you have any of these conditions:  · asthma  · cigarette smoker  · drink more than 3 alcohol containing drinks a day  · heart disease or circulation problems such as heart failure or leg edema (fluid retention)  · high blood pressure  · kidney disease  · liver disease  · stomach bleeding or ulcers  · an unusual or allergic reaction to naproxen, aspirin, other NSAIDs, other medicines, foods, dyes, or preservatives  · pregnant or trying to get pregnant  · breast-feeding  What should I watch for while using this medicine?  Tell your doctor or health care professional if your pain does not get better. Talk to your doctor before taking another medicine for pain. Do not treat yourself.  This medicine does not prevent heart attack or stroke. In fact, this medicine may increase the chance of a heart attack or stroke. The chance may increase with longer use of this medicine and in people who have heart disease. If you take aspirin to prevent heart attack or stroke, talk with your doctor or health care professional.  Do not take other medicines that contain aspirin, ibuprofen, or naproxen with this medicine. Side effects such as stomach upset, nausea, or ulcers may be more likely to occur. Many medicines available without a prescription should not be taken with this medicine.  This medicine can cause ulcers and bleeding in the stomach and intestines at any time during treatment. Do not smoke cigarettes or drink alcohol. These increase irritation to your stomach and can  make it more susceptible to damage from this medicine. Ulcers and bleeding can happen without warning symptoms and can cause death.  You may get drowsy or dizzy. Do not drive, use machinery, or do anything that needs mental alertness until you know how this medicine affects you. Do not stand or sit up quickly, especially if you are an older patient. This reduces the risk of dizzy or fainting spells.  This medicine can cause you to bleed more easily. Try to avoid damage to your teeth and gums when you brush or floss your teeth.  NOTE:This sheet is a summary. It may not cover all possible information. If you have questions about this medicine, talk to your doctor, pharmacist, or health care provider. Copyright© 2017 [employer]

## 2020-07-13 NOTE — PROGRESS NOTES
"Subjective:       Patient ID: Chayito Hoyt is a 25 y.o. female.    Chief Complaint: Sinus Problem, Cough, and Headache    HPI  Has been to urgent for same  Tested neg for covid  Has not returned to work   Here for ha and sinuses  Needs paperwork completed for leave    Prescribed fioricet but did not help  Taking one bid fioricet  Worse HA   Occurring temples and frontal  Characterized as tightness across head   Toradol injection helped  Denies n/v  A/w lightheadedness with bending  Denies watery eyes, vision disturbance    Review of Systems   Constitutional: Negative for fever.   HENT: Positive for facial swelling and sinus pain (dry). Negative for congestion, dental problem and postnasal drip. Hearing loss: improving.    Respiratory: Positive for cough. Negative for shortness of breath.    Gastrointestinal: Negative for nausea and vomiting.   Neurological: Positive for light-headedness and headaches. Negative for speech difficulty.        Objective:   /78   Pulse 80   Temp 97.6 °F (36.4 °C) (Tympanic)   Ht 5' 5" (1.651 m)   Wt 62 kg (136 lb 11 oz)   SpO2 99%   BMI 22.75 kg/m²     BP Readings from Last 3 Encounters:   07/13/20 120/78   07/07/20 130/85   03/28/20 121/80       No results found for: LABA1C, HGBA1C    Physical Exam  Vitals signs reviewed.   Constitutional:       General: She is not in acute distress.  HENT:      Head: Normocephalic and atraumatic.   Eyes:      General: No scleral icterus.     Conjunctiva/sclera: Conjunctivae normal.   Neck:      Musculoskeletal: Normal range of motion and neck supple.   Cardiovascular:      Rate and Rhythm: Normal rate and regular rhythm.      Heart sounds: Normal heart sounds.   Pulmonary:      Effort: Pulmonary effort is normal.      Breath sounds: Normal breath sounds. No wheezing.   Abdominal:      General: Bowel sounds are normal.      Palpations: Abdomen is soft.      Tenderness: There is no abdominal tenderness.   Musculoskeletal:         General: " No deformity.   Skin:     General: Skin is warm and dry.   Neurological:      Mental Status: She is alert and oriented to person, place, and time.      Cranial Nerves: No cranial nerve deficit.   Psychiatric:         Behavior: Behavior normal.       Assessment:     1. Intractable tension-type headache, unspecified chronicity pattern      Plan:     Problem List Items Addressed This Visit     None      Visit Diagnoses     Intractable tension-type headache, unspecified chronicity pattern    -  Primary    Relevant Medications    naproxen (NAPROSYN) 500 MG tablet      sx not specific to migraine but can be migrainous-like HA. Improved with toradol injection but did not resume aleve after 24h.   Advised naproxen and flexeril for tension-type HA  If no improvement, consider re-eval for migraine dx  [facility] paperwork completed and faxed to [employer] [phone]    Follow up if symptoms worsen or fail to improve.

## 2020-08-20 ENCOUNTER — LAB VISIT (OUTPATIENT)
Dept: LAB | Facility: HOSPITAL | Age: 25
End: 2020-08-20
Attending: NURSE PRACTITIONER
Payer: COMMERCIAL

## 2020-08-20 ENCOUNTER — OFFICE VISIT (OUTPATIENT)
Dept: OBSTETRICS AND GYNECOLOGY | Facility: CLINIC | Age: 25
End: 2020-08-20
Payer: COMMERCIAL

## 2020-08-20 VITALS
DIASTOLIC BLOOD PRESSURE: 82 MMHG | HEIGHT: 65 IN | SYSTOLIC BLOOD PRESSURE: 118 MMHG | WEIGHT: 137.13 LBS | BODY MASS INDEX: 22.85 KG/M2

## 2020-08-20 DIAGNOSIS — Z11.3 SCREENING FOR STD (SEXUALLY TRANSMITTED DISEASE): ICD-10-CM

## 2020-08-20 DIAGNOSIS — Z30.42 ENCOUNTER FOR DEPO-PROVERA CONTRACEPTION: ICD-10-CM

## 2020-08-20 DIAGNOSIS — Z01.419 ENCOUNTER FOR GYNECOLOGICAL EXAMINATION WITHOUT ABNORMAL FINDING: Primary | ICD-10-CM

## 2020-08-20 DIAGNOSIS — R87.619 ABNORMAL CERVICAL PAPANICOLAOU SMEAR, UNSPECIFIED ABNORMAL PAP FINDING: ICD-10-CM

## 2020-08-20 LAB — HCG INTACT+B SERPL-ACNC: <1.2 MIU/ML

## 2020-08-20 PROCEDURE — 87491 CHLMYD TRACH DNA AMP PROBE: CPT

## 2020-08-20 PROCEDURE — 84702 CHORIONIC GONADOTROPIN TEST: CPT

## 2020-08-20 PROCEDURE — 96372 THER/PROPH/DIAG INJ SC/IM: CPT | Mod: S$GLB,,, | Performed by: NURSE PRACTITIONER

## 2020-08-20 PROCEDURE — 36415 COLL VENOUS BLD VENIPUNCTURE: CPT

## 2020-08-20 PROCEDURE — 99385 PREV VISIT NEW AGE 18-39: CPT | Mod: 25,S$GLB,, | Performed by: NURSE PRACTITIONER

## 2020-08-20 PROCEDURE — 99999 PR PBB SHADOW E&M-EST. PATIENT-LVL III: CPT | Mod: PBBFAC,,, | Performed by: NURSE PRACTITIONER

## 2020-08-20 PROCEDURE — 88175 CYTOPATH C/V AUTO FLUID REDO: CPT | Performed by: PATHOLOGY

## 2020-08-20 PROCEDURE — 88141 PR  CYTOPATH CERV/VAG INTERPRET: ICD-10-PCS | Mod: ,,, | Performed by: PATHOLOGY

## 2020-08-20 PROCEDURE — 86592 SYPHILIS TEST NON-TREP QUAL: CPT

## 2020-08-20 PROCEDURE — 96372 PR INJECTION,THERAP/PROPH/DIAG2ST, IM OR SUBCUT: ICD-10-PCS | Mod: S$GLB,,, | Performed by: NURSE PRACTITIONER

## 2020-08-20 PROCEDURE — 88141 CYTOPATH C/V INTERPRET: CPT | Mod: ,,, | Performed by: PATHOLOGY

## 2020-08-20 PROCEDURE — 99385 PR PREVENTIVE VISIT,NEW,18-39: ICD-10-PCS | Mod: 25,S$GLB,, | Performed by: NURSE PRACTITIONER

## 2020-08-20 PROCEDURE — 86703 HIV-1/HIV-2 1 RESULT ANTBDY: CPT

## 2020-08-20 PROCEDURE — 99999 PR PBB SHADOW E&M-EST. PATIENT-LVL III: ICD-10-PCS | Mod: PBBFAC,,, | Performed by: NURSE PRACTITIONER

## 2020-08-20 PROCEDURE — 80074 ACUTE HEPATITIS PANEL: CPT

## 2020-08-20 RX ORDER — MEDROXYPROGESTERONE ACETATE 150 MG/ML
150 INJECTION, SUSPENSION INTRAMUSCULAR
Status: ACTIVE | OUTPATIENT
Start: 2020-08-20 | End: 2021-11-13

## 2020-08-20 RX ADMIN — MEDROXYPROGESTERONE ACETATE 150 MG: 150 INJECTION, SUSPENSION INTRAMUSCULAR at 04:08

## 2020-08-20 NOTE — PATIENT INSTRUCTIONS
The Range of Pap Test Results  When your Pap test is sent to the lab, the lab studies your cell samples and reports any abnormal cell changes. Your health care provider can discuss these changes with you. In some cases, an abnormal Pap test is due to an infection. More serious cell changes range from dysplasia to cancer. Talk to your health care provider about your Pap test.    Normal results  Cervical cells, even normal ones, are always changing. As they mature, normal squamous cells move from deeper layers within the cervix. Over time, these cells flatten and cover the surface of the cervix. Within the cervical canal, the cells are different. These glandular cells are taller and not as flat as the cells on the surface of the cervix. When a Pap test sample shows healthy cells of both types, the results are negative. Keep having Pap tests as often as directed.  Abnormal results  A positive Pap test result means some cells in the sample showed abnormal changes. These results are grouped by the type of cell change and the location, or extent, of the changes. Depending on the results, you may need further testing.  · Inflammation: Noncancerous changes are present. They may be due to normal cell repair. Or, they may be caused by an infection, such as HPV or yeast. Further testing may be needed. (Also called reactive cellular changes.)  · Atypical squamous cells: Test results are unclear. Cells on the surface of the cervix show changes, but their significance is not yet known. Testing for HPV and other sexually transmitted infections (STIs) may be needed. Treatment may be required. (Reported as ASC-US or ASC-H.)  · Atypical glandular cells: Cells lining the cervical canal show abnormal changes. Further testing is likely. You may also have treatment to destroy or remove problem cells. (Reported as AGC.)  · Mild dysplasia: Cells show distinct changes. More testing or HPV typing may be done. You may also have treatment to  destroy or remove problem cells. (Reported as low-grade SURYA or CONOR 1.)  · Moderate to severe dysplasia: Cells show precancerous changes. Or, noninvasive cancer (carcinoma in situ) may be present. Treatment to destroy or remove problem cells is likely. (Reported as high-grade SURYA or CONOR 2 or CONOR 3.)  · Cancer: Different types of cancer may be detected by your Pap test. More tests to assess the cancer's extent are likely. The type of treatment will depend on the test results and other factors, such as age and health history. (Reported as squamous cell carcinoma, endocervical adenocarcinoma in situ, or adenocarcinoma.)  Date Last Reviewed: 5/12/2015  © 7475-9405 [employer]. [address] [place] [address], [place] [address]. All rights reserved. This information is not intended as a substitute for professional medical care. Always follow your healthcare professional's instructions.        When You Have an Abnormal Pap Test    The Pap test is a screening test that checks for cell changes in the cervix, the opening of the uterus. In some cases, it checks for a virus that can cause cervical cancer. If your Pap results were abnormal, you may be worried. But there is no reason to panic. An abnormal Pap test result can mean many things. It may be due to changes (inflammation) caused by normal cell repair or infection. Or you may have a problem called dysplasia that could become cervical cancer. If so, know that dysplasia tends to progress very slowly before becoming cervical cancer. Thats why its so important to have Pap tests as often as directed. Pap tests can show cell changes in the cervix early, when treatment is most effective.  Talk to your health care provider  Be sure to discuss your results with your health care provider. Find out about any follow-up tests youll need. You may be asked to come back for a second Pap test in a few months. Or, you may be scheduled for an exam so your health care provider can  get a closer look at your cervix. In either case, be sure to keep your follow-up visits. They are one of your best safeguards against future problems.  Understanding your risk  Some lifestyle choices can increase your risk of abnormal cell changes. Did you start having sex at a young age? Have you had many sexual partners? Have you had sex without using a latex condom? Do you smoke? If you answered yes to any of these questions, you are more at risk. One of the most common reasons for an abnormal Pap result is infection with the human papillomavirus (HPV). If your Pap results suggest HPV, further testing may be needed.     The Pap test  During the test:  · An instrument called a speculum is inserted into the vagina to hold it open. This lets your health care provider see the cervix.  · A small brush or swab is used to take cells from several areas of the cervix. The cells are put into a liquid or on a slide. They are then sent to a lab where they are studied for changes. Your health care provider will contact you with the results.   Date Last Reviewed: 4/26/2015 © 2000-2017 [employer]. [address] [place] [address]. All rights reserved. This information is not intended as a substitute for professional medical care. Always follow your healthcare professional's instructions.        Breast Health: Breast Self-Awareness  What is breast self-awareness?  Breast self-awareness is knowing how your breasts normally look and feel. Your breasts change as you go through different stages of your life. So its important to learn what is normal for your breasts. Breast self-awareness helps you notice any changes in your breasts right away. Report any changes to your healthcare provider.  Why is breast self-awareness important?  Many experts now say that women should focus on breast self-awareness instead of doing a breast self-examination (BSE). These experts include the American Cancer Society, the  U.S. Preventive Services Task Force, and the American Congress of Obstetricians and Gynecologists. Some experts even advise not teaching women to do a BSE. Thats because research hasnt shown a clear benefit to doing BSEs.  Breast self-awareness is different than a BSE. Breast self-awareness isnt about following a certain method and schedule. Its about knowing what's normal for your breasts. That way you can notice even small changes right away. If you see any changes, report them to your healthcare provider.  Changes to look for  Call your healthcare provider if you find any changes in your breasts that concern you. These changes may include:  · A lump  · Nipple discharge other than breast milk, especially a bloody discharge  · Swelling  · A change in size or shape  · Skin irritation, such as redness, thickening, or dimpling of the skin  · Swollen lymph nodes in the armpit  · Nipple problems, such as pain or redness  If you find a lump  Contact your provider if you find lumpiness in one breast, feel something different in the tissue, or feel a definite lump. Sometimes lumpiness may be due to menstrual changes. But there may be reason for concern.  Your provider may want to see you right away if you have:  · Nipple discharge that is bloody  · Skin changes on your breast, such as dimpling or puckering  Its normal to be upset if you find a lump. But its important to contact your provider right away. Remember that most breast lumps are benign. This means they are not cancer.  Date Last Reviewed: 8/10/2015  © 1263-1461 [employer]. [address] [place] [address], [place] [address]. All rights reserved. This information is not intended as a substitute for professional medical care. Always follow your healthcare professional's instructions.        Clinical Breast Exam    Many health organizations recommend a yearly clinical breast exam. This exam may be done by a gynecologist, family healthcare provider, nurse  practitioner, or specially trained nurse. Yearly breast exams help to make sure that breast conditions are found early.  Your healthcare providers role  A healthcare professional knows the tests and follow-up care needed if a problem is found. Your clinical exam is also a great time to ask questions about breast self-exams. You can find out if youre checking your breasts in the best way. Or you may want to ask how pregnancy, breast implants, or breast reduction surgery affect the way you should check your breasts.  Diagnostic tests  If a clinical exam reveals a breast change, you may have other tests to find out more. These tests may include:  · Mammography. A low-dose X-ray of your breast tissue.  · Ultrasound. An imaging test that uses sound waves to create images of your breast.  · Biopsy. A small amount of breast tissue is removed by needle or by a cut (incision). The tissue is then checked under a microscope.  Guidelines for having clinical breast exams  The American College of Obstetricians and Gynecologists recommends that starting at age 29, you should have a clinical breast exam every 1 to 3 years. After age 40, have a clinical breast exam each year. If youre at higher risk for breast cancer, you may need exams more often. Risk factors for breast cancer may include:  · Being over 50 or postmenopausal  · Having a family history of breast cancer  · Having the BRCA1 or BRCA2 gene mutation or certain other gene mutations  · Having more menstrual periods due to starting menstruation early (before age 12) or having a late menopause (after age 55)  · Having no pregnancies  · Having a first pregnancy after age 30  · Being obese  · Having a history of radiation treatment to your chest area  · Exposure to SHEILA during your mother's pregnancy  · Not being active  · Drinking too much alcohol  · Having dense breast tissue  · Taking hormone therapy after menopause  Other health organizations have different  recommendations. Talk to your healthcare provider about what is best for you.   Date Last Reviewed: 8/13/2015  © 6321-0896 [employer]. [address] [place] [address], [place] [address]. All rights reserved. This information is not intended as a substitute for professional medical care. Always follow your healthcare professional's instructions.        Colposcopy  Colposcopy is a procedure that gives your health care provider a magnified view of the cervix. It is done using a lighted microscope called a colposcope. In most cases, a sample of cervical cells is taken during a biopsy. The sample can then be studied in a lab. If any problems are found, you and your health care provider will discuss treatment options. It usually takes less than 30 minutes, and you can often go back to your normal routine right away.  Reasons for the procedure  Colposcopy is usually done as a follow-up exam to help find the cause of an abnormal Pap test. Results of an abnormal Pap test can mean that the cells do not appear normal or that there are cancerous cells. Abnormal cells can also be caused by infections. HPV (human papillomavirus) is a large family of viruses that can be passed from person to person through sex. HPV can cause genital warts. It can also cause changes in cervical cells. If an HPV test is positive and the Pap test is abnormal, a colposcopy may be recommended. Colposcopy is also used to evaluate other problems. These include pain or bleeding during sexual intercourse, or a lesion on the vulva or vagina.  What are the risks?  Problems after colposcopy are very rare, but can include:  · Bleeding (if a biopsy is done)  · Infection  Getting ready for the procedure  Colposcopy is normally done in your health care providers office. It will be scheduled for a time when youre not having your menstrual period. You may be asked to sign a form giving your consent to have the procedure. A day or 2 before the procedure,  your health care provider may also ask you to:  · Avoid sexual intercourse.  · Stop using tampons.  · Avoid using creams or other vaginal medicines.  · Avoid douching.  · Take over-the-counter pain medicines an hour or 2 before the procedure.  During colposcopy  · You will be asked to lie on an exam table with your knees bent, just as you do for a Pap test.  · An instrument called a speculum is inserted into the vagina to hold it open.  · A vinegar solution is applied to the cervix to make the abnormal cells easier to see. You may feel pressure or a slight burning for a few moments. In some cases, the cervix may be numbed first with an anesthetic.  · The cervix is viewed through the colposcope, which is placed outside the vagina.  · If your health care provider sees abnormal areas on the cervix, a biopsy will be done. The tissue sample is sent to a lab for study.  · An endocervical curettage may also be done at the time of colposcopy. In this procedure, an instrument is put into the endocervical canal to get a sample of cells from the endocervix. This area can't be seen with a colposcope.   · You may feel slight pinching or cramping during the biopsy. Medicine may be applied to the biopsy site to stop bleeding.  After the procedure  · If you feel lightheaded or dizzy, you can rest on the table until youre ready to get dressed.  · If a biopsy was done, you may have mild cramping or light bleeding for a few days. You may also have discharge from the medicine used to stop bleeding at the biopsy site.  · Use pads, not tampons, for at least the first 24 hours.  · If you have any discomfort, over-the-counter pain medicine can provide relief.  · Ask your health care provider when you can resume sexual intercourse.  Follow-up  If a biopsy was done, your health care provider will get the lab report in a week or 2. You and your health care provider can then discuss the results. In some cases, you may be scheduled for further  tests or treatment. Be sure to keep follow-up appointments with your health care provider.     Call your health care provider if you have:  · Heavy vaginal bleeding (more than a pad an hour for 2 hours).  · Severe or increasing pelvic pain.  · A fever over 100.4°F (38°C)  · Foul-smelling or unusual vaginal discharge.   Date Last Reviewed: 5/10/2015  © 7600-4083 [employer]. [address] [place] [address], [place] [address]. All rights reserved. This information is not intended as a substitute for professional medical care. Always follow your healthcare professional's instructions.

## 2020-08-20 NOTE — PROGRESS NOTES
CC: Well woman exam    Chayito Hoyt is a 25 y.o. female No obstetric history on file. presents for well woman exam.  LMP: Patient's last menstrual period was 07/04/2020 (approximate)..  No issues, problems, or complaints.    Had abnormal pap at [facility] and was due to repeat in June, not sure what was abnormal.  Was on depo provera and was due for injection in 2/2020 but insurance changed and did not receive - also through [facility].  Would like to resume injection.  Had very light spotting cycle for July none yet for August.     History reviewed. No pertinent past medical history.  History reviewed. No pertinent surgical history.  Social History     Socioeconomic History    Marital status: Single     Spouse name: Not on file    Number of children: Not on file    Years of education: Not on file    Highest education level: Not on file   Occupational History    Not on file   Social Needs    Financial resource strain: Not on file    Food insecurity     Worry: Not on file     Inability: Not on file    Transportation needs     Medical: Not on file     Non-medical: Not on file   Tobacco Use    Smoking status: Never Smoker    Smokeless tobacco: Never Used   Substance and Sexual Activity    Alcohol use: Not Currently    Drug use: Never    Sexual activity: Yes     Partners: Male   Lifestyle    Physical activity     Days per week: Not on file     Minutes per session: Not on file    Stress: Not on file   Relationships    Social connections     Talks on phone: Not on file     Gets together: Not on file     Attends [employer] service: Not on file     Active member of club or organization: Not on file     Attends meetings of clubs or organizations: Not on file     Relationship status: Not on file   Other Topics Concern    Not on file   Social History Narrative    Not on file     Family History   Problem Relation Age of Onset    Cataracts Mother     Hypertension Maternal Grandmother      OB History    No obstetric  "history on file.         /82   Ht 5' 5" (1.651 m)   Wt 62.2 kg (137 lb 2 oz)   LMP 07/04/2020 (Approximate)   BMI 22.82 kg/m²       ROS:  GENERAL: Denies weight gain or weight loss. Feeling well overall.   SKIN: Denies rash or lesions.   HEAD: Denies head injury or headache.   NODES: Denies enlarged lymph nodes.   CHEST: Denies chest pain or shortness of breath.   CARDIOVASCULAR: Denies palpitations or left sided chest pain.   ABDOMEN: No abdominal pain, constipation, diarrhea, nausea, vomiting or rectal bleeding.   URINARY: No frequency, dysuria, hematuria, or burning on urination.  REPRODUCTIVE: See HPI.   BREASTS: The patient performs breast self-examination and denies pain, lumps, or nipple discharge.   HEMATOLOGIC: No easy bruisability or excessive bleeding.   MUSCULOSKELETAL: Denies joint pain or swelling.   NEUROLOGIC: Denies syncope or weakness.   PSYCHIATRIC: Denies depression, anxiety or mood swings.    PHYSICAL EXAM:  APPEARANCE: Well nourished, well developed, in no acute distress.  AFFECT: WNL, alert and oriented x 3  SKIN: No acne or hirsutism  NECK: Neck symmetric without masses or thyromegaly  NODES: No inguinal, cervical, axillary, or femoral lymph node enlargement  CHEST: Good respiratory effect  ABDOMEN: Soft.  No tenderness or masses.  No hepatosplenomegaly.  No hernias.  BREASTS: Symmetrical, no skin changes or visible lesions.  No palpable masses, nipple discharge bilaterally.  PELVIC: Normal external genitalia without lesions.  Normal hair distribution.  Adequate perineal body, normal urethral meatus.  Vagina moist and well rugated without lesions or discharge.  Cervix pink, without lesions, discharge or tenderness.  No significant cystocele or rectocele.  Bimanual exam shows uterus to be normal size, regular, mobile and nontender.  Adnexa without masses or tenderness.    EXTREMITIES: No edema.  Physical Exam    1. Encounter for gynecological examination without abnormal finding  " Liquid-Based Pap Smear, Screening   2. Abnormal cervical Papanicolaou smear, unspecified abnormal pap finding  Liquid-Based Pap Smear, Screening   3. Encounter for Depo-Provera contraception  hCG, quantitative   4. Screening for STD (sexually transmitted disease)  C. trachomatis/N. gonorrhoeae by AMP DNA    HIV 1/2 Ag/Ab (4th Gen)    RPR    Hepatitis Panel, Acute    AND PLAN:    Patient was counseled today on A.C.S. Pap guidelines and recommendations for yearly pelvic exams, mammograms and monthly self breast exams; to see her PCP for other health maintenance.     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

## 2020-08-20 NOTE — PROGRESS NOTES
Verified patient by 2 identifiers. Allergies and medications reviewed.   Depo Provera 150mg given IM to right ventrogluteal using aseptic technique.   Patient tolerated injection well. No signs or symptoms of distress noted. Next appointment scheduled. Patient told to wait in lobby for 15 minutes to monitor for any adverse reactions. Patient verbalized understanding.

## 2020-08-21 LAB
HAV IGM SERPL QL IA: NEGATIVE
HBV CORE IGM SERPL QL IA: NEGATIVE
HBV SURFACE AG SERPL QL IA: NEGATIVE
HCV AB SERPL QL IA: NEGATIVE
HIV 1+2 AB+HIV1 P24 AG SERPL QL IA: NEGATIVE
RPR SER QL: NORMAL

## 2020-08-24 ENCOUNTER — PATIENT MESSAGE (OUTPATIENT)
Dept: OBSTETRICS AND GYNECOLOGY | Facility: CLINIC | Age: 25
End: 2020-08-24

## 2020-09-04 LAB
FINAL PATHOLOGIC DIAGNOSIS: ABNORMAL
Lab: ABNORMAL

## 2020-09-11 LAB
C TRACH DNA SPEC QL NAA+PROBE: NOT DETECTED
N GONORRHOEA DNA SPEC QL NAA+PROBE: NOT DETECTED

## 2020-09-14 ENCOUNTER — PATIENT MESSAGE (OUTPATIENT)
Dept: OBSTETRICS AND GYNECOLOGY | Facility: CLINIC | Age: 25
End: 2020-09-14

## 2020-10-07 ENCOUNTER — PATIENT MESSAGE (OUTPATIENT)
Dept: OBSTETRICS AND GYNECOLOGY | Facility: CLINIC | Age: 25
End: 2020-10-07

## 2020-10-21 ENCOUNTER — PROCEDURE VISIT (OUTPATIENT)
Dept: OBSTETRICS AND GYNECOLOGY | Facility: CLINIC | Age: 25
End: 2020-10-21
Payer: COMMERCIAL

## 2020-10-21 VITALS
BODY MASS INDEX: 23.03 KG/M2 | DIASTOLIC BLOOD PRESSURE: 70 MMHG | SYSTOLIC BLOOD PRESSURE: 112 MMHG | HEIGHT: 65 IN | WEIGHT: 138.25 LBS

## 2020-10-21 DIAGNOSIS — R87.612 LGSIL ON PAP SMEAR OF CERVIX: Primary | ICD-10-CM

## 2020-10-21 PROCEDURE — 88305 TISSUE EXAM BY PATHOLOGIST: ICD-10-PCS | Mod: 26,,, | Performed by: PATHOLOGY

## 2020-10-21 PROCEDURE — 88305 TISSUE EXAM BY PATHOLOGIST: CPT | Mod: 26,,, | Performed by: PATHOLOGY

## 2020-10-21 PROCEDURE — 57456 PR COLPOSCOPY,CERVIX W/ADJ VAGINA, CURETTAG: ICD-10-PCS | Mod: S$GLB,,, | Performed by: OBSTETRICS & GYNECOLOGY

## 2020-10-21 PROCEDURE — 57456 ENDOCERV CURETTAGE W/SCOPE: CPT | Mod: S$GLB,,, | Performed by: OBSTETRICS & GYNECOLOGY

## 2020-10-21 PROCEDURE — 88305 TISSUE EXAM BY PATHOLOGIST: CPT | Performed by: PATHOLOGY

## 2020-10-21 NOTE — PATIENT INSTRUCTIONS
Human Papillomavirus Quadrivalent Vaccine suspension for injection  What is this medicine?  HUMAN PAPILLOMAVIRUS VACCINE (HYOO muhn pap uh [facility]ON[facility]) is a vaccine. It is used to prevent infections of four types of the human papillomavirus. In women, the vaccine may lower your risk of getting cervical, vaginal, vulvar, or anal cancer and genital warts. In men, the vaccine may lower your risk of getting genital warts and anal cancer. You cannot get these diseases from the vaccine. This vaccine does not treat these diseases.  How should I use this medicine?  This vaccine is for injection in a muscle on your upper arm or thigh. It is given by a health care professional. You will be observed for 15 minutes after each dose. Sometimes, fainting happens after the vaccine is given. You may be asked to sit or lie down during the 15 minutes. Three doses are given. The second dose is given 2 months after the first dose. The last dose is given 4 months after the second dose.  A copy of a Vaccine Information Statement will be given before each vaccination. Read this sheet carefully each time. The sheet may change frequently.  Talk to your pediatrician regarding the use of this medicine in children. While this drug may be prescribed for children as young as 9 years of age for selected conditions, precautions do apply.  What side effects may I notice from receiving this medicine?  Side effects that you should report to your doctor or health care professional as soon as possible:  · allergic reactions like skin rash, itching or hives, swelling of the face, lips, or tongue  · breathing problems  · feeling faint or lightheaded, falls  Side effects that usually do not require medical attention (report to your doctor or health care professional if they continue or are bothersome):  · cough  · dizziness  · fever  · headache  · nausea  · redness, warmth, swelling, pain, or itching at site where injected  What may  interact with this medicine?  · other vaccines  What if I miss a dose?  All 3 doses of the vaccine should be given within 6 months. Remember to keep appointments for follow-up doses. Your health care provider will tell you when to return for the next vaccine. Ask your health care professional for advice if you are unable to keep an appointment or miss a scheduled dose.  Where should I keep my medicine?  This drug is given in a hospital or clinic and will not be stored at home.  What should I tell my health care provider before I take this medicine?  They need to know if you have any of these conditions:  · fever or infection  · hemophilia  · HIV infection or AIDS  · immune system problems  · low platelet count  · an unusual reaction to Human Papillomavirus Vaccine, yeast, other medicines, foods, dyes, or preservatives  · pregnant or trying to get pregnant  · breast-feeding  What should I watch for while using this medicine?  This vaccine may not fully protect everyone. Continue to have regular pelvic exams and cervical or anal cancer screenings as directed by your doctor.  The Human Papillomavirus is a sexually transmitted disease. It can be passed by any kind of sexual activity that involves genital contact. The vaccine works best when given before you have any contact with the virus. Many people who have the virus do not have any signs or symptoms.  Tell your doctor or health care professional if you have any reaction or unusual symptom after getting the vaccine.  NOTE:This sheet is a summary. It may not cover all possible information. If you have questions about this medicine, talk to your doctor, pharmacist, or health care provider. Copyright© 2017 [employer]

## 2020-10-24 NOTE — PROCEDURES
Procedures   COLPOSCOPY:    Date: 10/24/2020  Support Staff: Aleyda Key    25 y.o. year old female patient   with presents for colposcopy.  UPT is neg; on depo-provera.  Her most recent papsmear showed LGSIL on 2020    PRE-COLPOSCOPY PROCEDURE COUNSELING:  Discussed the abnormal pap test findings, HPV, need for colposcopy and possible biopsies to determine a diagnosis and plan of care, treatments available, the minimal risks of bleeding and infection with a colposcopy, alternatives to colposcopy and she agrees to proceed.    COLPOSCOPY EXAM:   TIME OUT PERFORMED. There were no vulvar lesions suggestive of condyloma present. The cervix was visualized with a speculum. Acetic acid was applied. Abnormalities: none. Biopsy done at none. ECC done as T-Z not well visible  Specimens placed in formalin and sent to pathology.   EBL: <5cc     The speculum was removed. The patient tolerated the procedure well.    IMPRESSION:   Abnormal Pap 795.09    POST COLPOSCOPY COUNSELING:   Manage post colposcopy cramping with NSAIDs, Tylenol or Rx per [facility].  Avoid anything in vagina (intercourse, douching, tampons) one week after the procedure.  Report bleeding heavier than a period, worsening pain, fever > 101.0 F, or foul-smelling vaginal discharge.  Importance of follow-up stressed.    Counseling lasted approximately 15 minutes and all her questions were answered.    FOLLOW-UP:   Based on biopsy results.

## 2020-10-26 ENCOUNTER — PATIENT MESSAGE (OUTPATIENT)
Dept: OBSTETRICS AND GYNECOLOGY | Facility: CLINIC | Age: 25
End: 2020-10-26

## 2020-10-26 LAB
FINAL PATHOLOGIC DIAGNOSIS: NORMAL
GROSS: NORMAL

## 2020-11-04 ENCOUNTER — PATIENT MESSAGE (OUTPATIENT)
Dept: OBSTETRICS AND GYNECOLOGY | Facility: CLINIC | Age: 25
End: 2020-11-04

## 2020-11-05 ENCOUNTER — CLINICAL SUPPORT (OUTPATIENT)
Dept: OBSTETRICS AND GYNECOLOGY | Facility: CLINIC | Age: 25
End: 2020-11-05
Payer: COMMERCIAL

## 2020-11-05 PROCEDURE — 96372 PR INJECTION,THERAP/PROPH/DIAG2ST, IM OR SUBCUT: ICD-10-PCS | Mod: S$GLB,,, | Performed by: NURSE PRACTITIONER

## 2020-11-05 PROCEDURE — 96372 THER/PROPH/DIAG INJ SC/IM: CPT | Mod: S$GLB,,, | Performed by: NURSE PRACTITIONER

## 2020-11-05 RX ADMIN — MEDROXYPROGESTERONE ACETATE 150 MG: 150 INJECTION, SUSPENSION INTRAMUSCULAR at 08:11

## 2020-11-05 NOTE — PROGRESS NOTES
Verified patient by 2 identifiers. Allergies and medications reviewed.   Depo Provera 150mg given IM to left ventrogluteal using aseptic technique.   Patient tolerated injection well. No signs or symptoms of distress noted. Next appointment scheduled. Patient told to wait in lobby for 15 minutes to monitor for any adverse reactions. Patient verbalized understanding.

## 2021-01-21 ENCOUNTER — CLINICAL SUPPORT (OUTPATIENT)
Dept: OBSTETRICS AND GYNECOLOGY | Facility: CLINIC | Age: 26
End: 2021-01-21
Payer: COMMERCIAL

## 2021-01-21 ENCOUNTER — PATIENT MESSAGE (OUTPATIENT)
Dept: OBSTETRICS AND GYNECOLOGY | Facility: CLINIC | Age: 26
End: 2021-01-21

## 2021-01-21 DIAGNOSIS — Z11.3 SCREENING FOR STD (SEXUALLY TRANSMITTED DISEASE): Primary | ICD-10-CM

## 2021-01-21 PROCEDURE — 96372 PR INJECTION,THERAP/PROPH/DIAG2ST, IM OR SUBCUT: ICD-10-PCS | Mod: S$GLB,,, | Performed by: NURSE PRACTITIONER

## 2021-01-21 PROCEDURE — 99999 PR PBB SHADOW E&M-EST. PATIENT-LVL I: CPT | Mod: PBBFAC,,,

## 2021-01-21 PROCEDURE — 99999 PR PBB SHADOW E&M-EST. PATIENT-LVL I: ICD-10-PCS | Mod: PBBFAC,,,

## 2021-01-21 PROCEDURE — 96372 THER/PROPH/DIAG INJ SC/IM: CPT | Mod: S$GLB,,, | Performed by: NURSE PRACTITIONER

## 2021-01-21 RX ADMIN — MEDROXYPROGESTERONE ACETATE 150 MG: 150 INJECTION, SUSPENSION INTRAMUSCULAR at 01:01

## 2021-01-22 ENCOUNTER — LAB VISIT (OUTPATIENT)
Dept: LAB | Facility: HOSPITAL | Age: 26
End: 2021-01-22
Attending: NURSE PRACTITIONER
Payer: COMMERCIAL

## 2021-01-22 DIAGNOSIS — Z11.3 SCREENING FOR STD (SEXUALLY TRANSMITTED DISEASE): ICD-10-CM

## 2021-01-22 PROCEDURE — 86592 SYPHILIS TEST NON-TREP QUAL: CPT

## 2021-01-22 PROCEDURE — 86703 HIV-1/HIV-2 1 RESULT ANTBDY: CPT

## 2021-01-22 PROCEDURE — 36415 COLL VENOUS BLD VENIPUNCTURE: CPT

## 2021-01-22 PROCEDURE — 87340 HEPATITIS B SURFACE AG IA: CPT

## 2021-01-23 LAB — RPR SER QL: NORMAL

## 2021-01-25 ENCOUNTER — PATIENT MESSAGE (OUTPATIENT)
Dept: OBSTETRICS AND GYNECOLOGY | Facility: CLINIC | Age: 26
End: 2021-01-25

## 2021-01-25 LAB
HBV SURFACE AG SERPL QL IA: NEGATIVE
HIV 1+2 AB+HIV1 P24 AG SERPL QL IA: NEGATIVE
